# Patient Record
(demographics unavailable — no encounter records)

---

## 2025-03-20 NOTE — ASSESSMENT
[FreeTextEntry1] : Impression: Average risk colorectal cancer needs to complete first routine screening colonoscopy.  Diabetes and hypertension on medication.  Plan: Discussed colorectal cancer screening protocols in detail with patient.  Questions answered.  Prep discussed in detail.  Hold a.m. dose of diabetic medication until after procedure.  Schedule colonoscopy with Suprep.
Clear bilaterally, pupils equal, round and reactive to light.